# Patient Record
Sex: MALE | Race: WHITE | NOT HISPANIC OR LATINO | Employment: OTHER | URBAN - METROPOLITAN AREA
[De-identification: names, ages, dates, MRNs, and addresses within clinical notes are randomized per-mention and may not be internally consistent; named-entity substitution may affect disease eponyms.]

---

## 2017-07-31 ENCOUNTER — LAB VISIT (OUTPATIENT)
Dept: LAB | Facility: HOSPITAL | Age: 77
End: 2017-07-31
Attending: UROLOGY
Payer: COMMERCIAL

## 2017-07-31 ENCOUNTER — OFFICE VISIT (OUTPATIENT)
Dept: UROLOGY | Facility: CLINIC | Age: 77
End: 2017-07-31
Payer: COMMERCIAL

## 2017-07-31 VITALS
HEART RATE: 63 BPM | SYSTOLIC BLOOD PRESSURE: 132 MMHG | DIASTOLIC BLOOD PRESSURE: 89 MMHG | WEIGHT: 190.5 LBS | BODY MASS INDEX: 26.67 KG/M2 | HEIGHT: 71 IN

## 2017-07-31 DIAGNOSIS — R97.20 ELEVATED PSA: ICD-10-CM

## 2017-07-31 DIAGNOSIS — N40.1 HYPERTROPHY OF PROSTATE WITH URINARY OBSTRUCTION AND OTHER LOWER URINARY TRACT SYMPTOMS (LUTS): ICD-10-CM

## 2017-07-31 DIAGNOSIS — N13.8 HYPERTROPHY OF PROSTATE WITH URINARY OBSTRUCTION AND OTHER LOWER URINARY TRACT SYMPTOMS (LUTS): ICD-10-CM

## 2017-07-31 DIAGNOSIS — R35.1 NOCTURIA MORE THAN TWICE PER NIGHT: ICD-10-CM

## 2017-07-31 DIAGNOSIS — Z00.00 ANNUAL PHYSICAL EXAM: Primary | ICD-10-CM

## 2017-07-31 LAB
BILIRUB SERPL-MCNC: NORMAL MG/DL
BLOOD URINE, POC: NORMAL
COLOR, POC UA: YELLOW
COMPLEXED PSA SERPL-MCNC: 2.2 NG/ML
GLUCOSE UR QL STRIP: NORMAL
KETONES UR QL STRIP: NORMAL
LEUKOCYTE ESTERASE URINE, POC: NORMAL
NITRITE, POC UA: NORMAL
PH, POC UA: 7
POC RESIDUAL URINE VOLUME: 2 ML (ref 0–100)
PROTEIN, POC: NORMAL
SPECIFIC GRAVITY, POC UA: 1.01
UROBILINOGEN, POC UA: NORMAL

## 2017-07-31 PROCEDURE — 1126F AMNT PAIN NOTED NONE PRSNT: CPT | Mod: S$GLB,,, | Performed by: UROLOGY

## 2017-07-31 PROCEDURE — 36415 COLL VENOUS BLD VENIPUNCTURE: CPT | Mod: PO

## 2017-07-31 PROCEDURE — 1159F MED LIST DOCD IN RCRD: CPT | Mod: S$GLB,,, | Performed by: UROLOGY

## 2017-07-31 PROCEDURE — 99999 PR PBB SHADOW E&M-EST. PATIENT-LVL III: CPT | Mod: PBBFAC,,, | Performed by: UROLOGY

## 2017-07-31 PROCEDURE — 81002 URINALYSIS NONAUTO W/O SCOPE: CPT | Mod: S$GLB,,, | Performed by: UROLOGY

## 2017-07-31 PROCEDURE — 99204 OFFICE O/P NEW MOD 45 MIN: CPT | Mod: 25,S$GLB,, | Performed by: UROLOGY

## 2017-07-31 PROCEDURE — 51798 US URINE CAPACITY MEASURE: CPT | Mod: S$GLB,,, | Performed by: UROLOGY

## 2017-07-31 PROCEDURE — 84153 ASSAY OF PSA TOTAL: CPT

## 2017-07-31 RX ORDER — DIVALPROEX SODIUM 500 MG/1
TABLET, DELAYED RELEASE ORAL
COMMUNITY
Start: 2017-07-05 | End: 2017-07-31

## 2017-07-31 RX ORDER — RIFAXIMIN 550 MG/1
TABLET ORAL
COMMUNITY
Start: 2017-07-05

## 2017-07-31 NOTE — PROGRESS NOTES
Subjective:       Patient ID: Phoenix Cohen is a 76 y.o. male.    Chief Complaint: Annual Exam    HPI     76 year old recently relocated from [place].  His last PSA was slightly elevated at Dr. Guadarrama's office.  His PCP in [place] recommend another PSA.  He has no family history of prostate cancer.  He takes Enablex for nocturia which has been a long term problems.  He still has nocturia 1-2.  He has no other bothersome symptoms.  He denies hematuria and dysuria.  Rare urinary tract infections.  UA is clear.  Last PSA in record here is 1.1 in 2012.  Urine dipstick shows negative for all components.  PVR 2 ml    Lab Results   Component Value Date    PSA 1.1 07/27/2012       Past Medical History:   Diagnosis Date    Coronary artery disease     Depression     Hypertension      Past Surgical History:   Procedure Laterality Date    FINGER SURGERY         Current Outpatient Prescriptions:     aspirin 81 MG Chew, Take 81 mg by mouth once daily., Disp: , Rfl:     darifenacin (ENABLEX) 15 mg 24 hr tablet, Take 15 mg by mouth once daily., Disp: , Rfl:     divalproex (DEPAKOTE) 500 MG Tb24, Take 500 mg by mouth once daily., Disp: , Rfl:     pravastatin (PRAVACHOL) 20 MG tablet, Take 20 mg by mouth once daily., Disp: , Rfl:     valsartan-hydrochlorothiazide (DIOVAN-HCT) 160-12.5 mg per tablet, Take 1 tablet by mouth once daily., Disp: , Rfl:     XIFAXAN 550 mg Tab, , Disp: , Rfl:     Review of Systems   Constitutional: Negative for fever.   Eyes: Negative for visual disturbance.   Respiratory: Negative for shortness of breath.    Cardiovascular: Negative for chest pain.   Gastrointestinal: Negative for nausea.   Genitourinary: Negative for dysuria and hematuria.   Musculoskeletal: Negative for gait problem.   Skin: Negative for rash.   Neurological: Negative for seizures.   Psychiatric/Behavioral: Negative for confusion.       Objective:      Physical Exam   Constitutional: He is oriented to person, place, and  time. He appears well-developed and well-nourished.   HENT:   Head: Normocephalic and atraumatic.   Eyes: Conjunctivae are normal.   Cardiovascular: Normal rate.    Pulmonary/Chest: Effort normal.   Genitourinary: Rectal exam shows no mass and anal tone normal. Prostate is enlarged (40g s/s/a). Prostate is not tender.   Musculoskeletal: Normal range of motion.   Neurological: He is alert and oriented to person, place, and time.   Skin: Skin is warm and dry. No rash noted.   Psychiatric: He has a normal mood and affect.   Vitals reviewed.      Assessment:       1. Annual physical exam    2. Elevated PSA    3. Hypertrophy of prostate with urinary obstruction and other lower urinary tract symptoms (LUTS)    4. Nocturia more than twice per night        Plan:       Annual physical exam  -     POCT urine dipstick without microscope    Elevated PSA  -     Prostate Specific Antigen, Diagnostic; Future; Expected date: 07/31/2017  -     POCT Bladder Scan    Hypertrophy of prostate with urinary obstruction and other lower urinary tract symptoms (LUTS)    Nocturia more than twice per night      Repeat PSA.  Get last PSA from Dr. Guadarrama

## 2017-08-03 ENCOUNTER — TELEPHONE (OUTPATIENT)
Dept: UROLOGY | Facility: CLINIC | Age: 77
End: 2017-08-03

## 2017-08-03 NOTE — TELEPHONE ENCOUNTER
----- Message from Lakeisha Bender sent at 8/3/2017 11:26 AM CDT -----  Returning your call.  Please call ever/Max at [phone].

## 2017-08-03 NOTE — TELEPHONE ENCOUNTER
----- Message from LESLY Dumas MD sent at 8/2/2017  4:59 PM CDT -----  Call with PSA result.  Very good.

## 2018-10-11 ENCOUNTER — OFFICE VISIT (OUTPATIENT)
Dept: URGENT CARE | Facility: CLINIC | Age: 78
End: 2018-10-11
Payer: COMMERCIAL

## 2018-10-11 VITALS
HEART RATE: 68 BPM | RESPIRATION RATE: 16 BRPM | DIASTOLIC BLOOD PRESSURE: 92 MMHG | OXYGEN SATURATION: 99 % | HEIGHT: 71 IN | TEMPERATURE: 98 F | SYSTOLIC BLOOD PRESSURE: 139 MMHG | WEIGHT: 190 LBS | BODY MASS INDEX: 26.6 KG/M2

## 2018-10-11 DIAGNOSIS — S99.922A INJURY OF TOE ON LEFT FOOT, INITIAL ENCOUNTER: Primary | ICD-10-CM

## 2018-10-11 DIAGNOSIS — B35.1 FUNGAL INFECTION OF NAIL: ICD-10-CM

## 2018-10-11 PROCEDURE — 99214 OFFICE O/P EST MOD 30 MIN: CPT | Mod: S$GLB,,, | Performed by: PHYSICIAN ASSISTANT

## 2018-10-11 RX ORDER — MUPIROCIN 20 MG/G
OINTMENT TOPICAL 2 TIMES DAILY
Qty: 22 G | Refills: 0 | Status: SHIPPED | OUTPATIENT
Start: 2018-10-11 | End: 2018-10-21

## 2018-10-11 NOTE — PROGRESS NOTES
"Subjective:       Patient ID: Phoenix Cohen is a 77 y.o. male.    Vitals:  height is 5' 11" (1.803 m) and weight is 86.2 kg (190 lb). His temperature is 98.3 °F (36.8 °C). His blood pressure is 139/92 (abnormal) and his pulse is 68. His respiration is 16 and oxygen saturation is 99%.     Chief Complaint: Ingrown Toenail    Pt possibly had trama to his left big toe and nail has cracked in half. Pt has dementia so is unclear of the exact cause of injury.       Ingrown Toenail   This is a new problem. The current episode started today. The problem occurs constantly. The problem has been unchanged. Pertinent negatives include no abdominal pain, chest pain, neck pain, numbness or weakness.     Review of Systems   Constitution: Negative for weakness and malaise/fatigue.   HENT: Negative for nosebleeds.    Cardiovascular: Negative for chest pain and syncope.   Respiratory: Negative for shortness of breath.    Musculoskeletal: Negative for back pain, joint pain and neck pain.   Gastrointestinal: Negative for abdominal pain.   Genitourinary: Negative for hematuria.   Neurological: Negative for dizziness and numbness.       Objective:      Physical Exam   Constitutional: He is oriented to person, place, and time. He appears well-developed and well-nourished.   HENT:   Head: Normocephalic and atraumatic. Head is without abrasion, without contusion and without laceration.   Right Ear: External ear normal.   Left Ear: External ear normal.   Nose: Nose normal.   Mouth/Throat: Oropharynx is clear and moist.   Eyes: Conjunctivae, EOM and lids are normal. Pupils are equal, round, and reactive to light.   Neck: Trachea normal, full passive range of motion without pain and phonation normal. Neck supple.   Cardiovascular: Normal rate, regular rhythm and normal heart sounds.   Pulmonary/Chest: Effort normal and breath sounds normal. No stridor. No respiratory distress.   Musculoskeletal: Normal range of motion.   Neurological: He is " alert and oriented to person, place, and time.   Skin: Skin is warm, dry and intact. Capillary refill takes less than 2 seconds. No abrasion, no bruising, no burn, no ecchymosis, no laceration, no lesion and no rash noted. No erythema.   Left large toenail with fungal infection and cracked down center of nail. Bleeding controlled.   Psychiatric: He has a normal mood and affect. His speech is normal and behavior is normal. Judgment and thought content normal. Cognition and memory are normal.   Nursing note and vitals reviewed.      Assessment:       1. Injury of toe on left foot, initial encounter    2. Fungal infection of nail        Plan:         Injury of toe on left foot, initial encounter    Fungal infection of nail  -     Ambulatory referral to Podiatry    Other orders  -     mupirocin (BACTROBAN) 2 % ointment; Apply topically 2 (two) times daily. for 10 days  Dispense: 22 g; Refill: 0     Wound was cleaned and dressed.  Bactroban ointment used.  The nail was still adherent to the nail bed so was not removed today.  I will place a referral to Podiatry for fungal infection.  Once the nail has healed he should follow up for treatment of fungal infection.  Patient has dementia so history was provided by his son who was with him.  He lives with the son who will keep an eye on the wound.  He will return to clinic or go to the ER if he develops any signs of infection.

## 2018-10-17 ENCOUNTER — TELEPHONE (OUTPATIENT)
Dept: URGENT CARE | Facility: CLINIC | Age: 78
End: 2018-10-17

## 2018-10-29 ENCOUNTER — OFFICE VISIT (OUTPATIENT)
Dept: PODIATRY | Facility: CLINIC | Age: 78
End: 2018-10-29
Payer: COMMERCIAL

## 2018-10-29 VITALS — HEIGHT: 71 IN | WEIGHT: 190.06 LBS | BODY MASS INDEX: 26.61 KG/M2

## 2018-10-29 DIAGNOSIS — B35.1 ONYCHOMYCOSIS DUE TO DERMATOPHYTE: ICD-10-CM

## 2018-10-29 DIAGNOSIS — S91.209A AVULSION OF TOENAIL, INITIAL ENCOUNTER: Primary | ICD-10-CM

## 2018-10-29 PROCEDURE — 99203 OFFICE O/P NEW LOW 30 MIN: CPT | Mod: S$GLB,,, | Performed by: PODIATRIST

## 2018-10-29 PROCEDURE — 99999 PR PBB SHADOW E&M-EST. PATIENT-LVL II: CPT | Mod: PBBFAC,,, | Performed by: PODIATRIST

## 2018-10-29 PROCEDURE — 99212 OFFICE O/P EST SF 10 MIN: CPT | Mod: PBBFAC,PN | Performed by: PODIATRIST

## 2018-10-29 RX ORDER — LACTULOSE 10 G/15ML
SOLUTION ORAL; RECTAL
COMMUNITY
Start: 2018-10-15

## 2018-10-29 RX ORDER — LOSARTAN POTASSIUM AND HYDROCHLOROTHIAZIDE 25; 100 MG/1; MG/1
TABLET ORAL
COMMUNITY
Start: 2018-08-09

## 2018-10-29 RX ORDER — MEMANTINE HYDROCHLORIDE 10 MG/1
TABLET ORAL
COMMUNITY
Start: 2018-10-09

## 2018-10-29 RX ORDER — METOPROLOL TARTRATE 25 MG/1
TABLET, FILM COATED ORAL
COMMUNITY
Start: 2018-10-15

## 2018-10-29 RX ORDER — DONEPEZIL HYDROCHLORIDE 10 MG/1
TABLET, FILM COATED ORAL
COMMUNITY
Start: 2018-10-22

## 2018-10-29 NOTE — LETTER
October 29, 2018      SALVADOR Mcgowan  [address] [address]           [facility]  [address] [facility] [address] [place] [address]  Phone: [phone]          Patient: Phoenix Cohen   MR Number: [id number]   YOB: 1940   Date of Visit: 10/29/2018       Dear Carrie Sewell:    Thank you for referring Phoenix Cohen to me for evaluation. Attached you will find relevant portions of my assessment and plan of care.    If you have questions, please do not hesitate to call me. I look forward to following Phoenix Cohen along with you.    Sincerely,    Blas Cobb DPM    Enclosure  CC:  No Recipients    If you would like to receive this communication electronically, please contact [email or web address] or [phone] to request more information on [facility] access.    For providers and/or their staff who would like to refer a patient to [facility], please contact us through our one-stop-shop provider referral line, [facility], at [phone].    If you feel you have received this communication in error or would no longer like to receive these types of communications, please e-mail [email or web address]

## 2018-10-30 NOTE — PROGRESS NOTES
"Subjective:      Patient ID: Phoenix Cohen is a 77 y.o. male.    Chief Complaint: Nail Problem (left great toe injured and cracked the nail and started bleeding)  Patient presents to clinic with the chief complaint of injury to the Lt. Great toenail that occurred over two weeks ago.  Patient's son relates all pertinent past medical history on account of father's dementia.  Relates that his father stubbed the affected digit with ensuing bleeding and "cracking" of the nail plate.  States this prompted a visit to Urgent Care where the toe was bandaged and was prescribed mupirocin ointment for daily application.  Denies pain to the affected digit with today's exam.  Relates ambulation to tolerance without issue.  Denies any additional pedal complaints.      Past Medical History:   Diagnosis Date    Coronary artery disease     Depression     Hypertension        Past Surgical History:   Procedure Laterality Date    FINGER SURGERY         Family History   Problem Relation Age of Onset    Stroke Mother        Social History     Socioeconomic History    Marital status:      Spouse name: None    Number of children: None    Years of education: None    Highest education level: None   Social Needs    Financial resource strain: None    Food insecurity - worry: None    Food insecurity - inability: None    Transportation needs - medical: None    Transportation needs - non-medical: None   Occupational History    None   Tobacco Use    Smoking status: Current Every Day Smoker    Smokeless tobacco: Current User   Substance and Sexual Activity    Alcohol use: None    Drug use: None    Sexual activity: None   Other Topics Concern    None   Social History Narrative    None       Current Outpatient Medications   Medication Sig Dispense Refill    aspirin 81 MG Chew Take 81 mg by mouth once daily.      darifenacin (ENABLEX) 15 mg 24 hr tablet Take 15 mg by mouth once daily.      divalproex (DEPAKOTE) 500 " MG Tb24 Take 500 mg by mouth once daily.      donepezil (ARICEPT) 10 MG tablet       lactulose (CHRONULAC) 10 gram/15 mL solution       losartan-hydrochlorothiazide 100-25 mg (HYZAAR) 100-25 mg per tablet       memantine (NAMENDA) 10 MG Tab       metoprolol tartrate (LOPRESSOR) 25 MG tablet       pravastatin (PRAVACHOL) 20 MG tablet Take 20 mg by mouth once daily.      valsartan-hydrochlorothiazide (DIOVAN-HCT) 160-12.5 mg per tablet Take 1 tablet by mouth once daily.      XIFAXAN 550 mg Tab        No current facility-administered medications for this visit.        Review of patient's allergies indicates:  No Known Allergies    Review of Systems   Constitution: Negative for chills and fever.   Cardiovascular: Positive for leg swelling. Negative for claudication.   Skin: Positive for color change and nail changes.   Musculoskeletal: Positive for joint swelling. Negative for muscle cramps, muscle weakness and myalgias.   Neurological: Negative for numbness and paresthesias.   Psychiatric/Behavioral: Negative for altered mental status.           Objective:      Physical Exam   Constitutional: He is oriented to person, place, and time. He appears well-developed and well-nourished. No distress.   Cardiovascular:   Pulses:       Dorsalis pedis pulses are 2+ on the right side, and 2+ on the left side.        Posterior tibial pulses are 1+ on the right side, and 1+ on the left side.   CFT is < 3 seconds bilateral.  Pedal hair growth is decreased bilateral.  Varicosities noted bilateral.  Moderate nonpitting edema noted to bilateral lower extremity.  Toes are warm to touch bilateral.     Musculoskeletal: He exhibits edema. He exhibits no tenderness.   Muscle strength 5/5 in all muscle groups bilateral.  No tenderness nor crepitation with ROM of foot/ankle joints bilateral.  No tenderness with palpation of bilateral foot and ankle.     Neurological: He is alert and oriented to person, place, and time. He has normal  strength. No sensory deficit.   Light touch intact bilateral.     Skin: Skin is warm, dry and intact. Capillary refill takes less than 2 seconds. No abrasion, no bruising, no burn, no ecchymosis, no laceration, no lesion, no petechiae and no rash noted. He is not diaphoretic. No erythema. No pallor.   Increased pedal turgor noted to bilateral lower extremity. Hemosiderin staining noted to bilateral lower extremity.  Toenails x 10 appear mycotic.  Traumatic avulsion of the Lt. Hallux toenail with more than 50% of the distal nail plate avulsed.  Exposed distal nail bed is fully epithelialized.  No localized sign of infection noted. Examination of the skin reveals no evidence of significant maceration, rashes, open lesions, suspicious appearing nevi or other concerning lesions.              Assessment:       Encounter Diagnoses   Name Primary?    Onychomycosis due to dermatophyte     Avulsion of toenail, initial encounter Yes         Plan:       Phoenix was seen today for nail problem.    Diagnoses and all orders for this visit:    Avulsion of toenail, initial encounter    Onychomycosis due to dermatophyte      I counseled the patient on his conditions, their implications and medical management.    With patient's permission, an electric  was used to smooth bilateral hallux toenail in efforts to prevent future nail avulsion. Patient relates relief following the procedure.     Advised to apply vicks vaporub to all toenails daily x 6 months to begin treating the fungal nail infection.    May discontinue application of Mupirocin ointment, as there is no exposed wound bed nor localized sign of infection.    RTC prn.    Blas Cobb DPM

## 2021-07-01 ENCOUNTER — PATIENT MESSAGE (OUTPATIENT)
Dept: ADMINISTRATIVE | Facility: OTHER | Age: 81
End: 2021-07-01